# Patient Record
Sex: FEMALE | Race: WHITE | NOT HISPANIC OR LATINO
[De-identification: names, ages, dates, MRNs, and addresses within clinical notes are randomized per-mention and may not be internally consistent; named-entity substitution may affect disease eponyms.]

---

## 2019-06-12 ENCOUNTER — FORM ENCOUNTER (OUTPATIENT)
Age: 74
End: 2019-06-12

## 2020-06-04 ENCOUNTER — FORM ENCOUNTER (OUTPATIENT)
Age: 75
End: 2020-06-04

## 2020-10-18 ENCOUNTER — FORM ENCOUNTER (OUTPATIENT)
Age: 75
End: 2020-10-18

## 2020-12-30 ENCOUNTER — TRANSCRIPTION ENCOUNTER (OUTPATIENT)
Age: 75
End: 2020-12-30

## 2020-12-30 ENCOUNTER — OUTPATIENT (OUTPATIENT)
Dept: OUTPATIENT SERVICES | Facility: HOSPITAL | Age: 75
LOS: 1 days | End: 2020-12-30
Payer: MEDICARE

## 2020-12-30 DIAGNOSIS — Z20.828 CONTACT WITH AND (SUSPECTED) EXPOSURE TO OTHER VIRAL COMMUNICABLE DISEASES: ICD-10-CM

## 2020-12-30 LAB — SARS-COV-2 RNA SPEC QL NAA+PROBE: SIGNIFICANT CHANGE UP

## 2020-12-30 PROCEDURE — C9803: CPT

## 2020-12-30 PROCEDURE — U0003: CPT

## 2020-12-31 DIAGNOSIS — Z20.828 CONTACT WITH AND (SUSPECTED) EXPOSURE TO OTHER VIRAL COMMUNICABLE DISEASES: ICD-10-CM

## 2021-04-13 PROBLEM — Z00.00 ENCOUNTER FOR PREVENTIVE HEALTH EXAMINATION: Status: ACTIVE | Noted: 2021-04-13

## 2021-06-01 DIAGNOSIS — Z87.01 PERSONAL HISTORY OF PNEUMONIA (RECURRENT): ICD-10-CM

## 2021-06-01 DIAGNOSIS — Z87.891 PERSONAL HISTORY OF NICOTINE DEPENDENCE: ICD-10-CM

## 2021-06-01 DIAGNOSIS — Z86.39 PERSONAL HISTORY OF OTHER ENDOCRINE, NUTRITIONAL AND METABOLIC DISEASE: ICD-10-CM

## 2021-06-04 ENCOUNTER — APPOINTMENT (OUTPATIENT)
Dept: BREAST CENTER | Facility: CLINIC | Age: 76
End: 2021-06-04
Payer: MEDICARE

## 2021-06-04 VITALS — DIASTOLIC BLOOD PRESSURE: 81 MMHG | HEART RATE: 60 BPM | HEIGHT: 62.5 IN | SYSTOLIC BLOOD PRESSURE: 146 MMHG

## 2021-06-04 DIAGNOSIS — Z80.3 FAMILY HISTORY OF MALIGNANT NEOPLASM OF BREAST: ICD-10-CM

## 2021-06-04 DIAGNOSIS — N60.12 DIFFUSE CYSTIC MASTOPATHY OF LEFT BREAST: ICD-10-CM

## 2021-06-04 DIAGNOSIS — Z87.19 PERSONAL HISTORY OF OTHER DISEASES OF THE DIGESTIVE SYSTEM: ICD-10-CM

## 2021-06-04 DIAGNOSIS — N60.11 DIFFUSE CYSTIC MASTOPATHY OF LEFT BREAST: ICD-10-CM

## 2021-06-04 PROCEDURE — 99214 OFFICE O/P EST MOD 30 MIN: CPT

## 2021-06-04 RX ORDER — LEVOTHYROXINE SODIUM 75 UG/1
75 TABLET ORAL
Refills: 0 | Status: ACTIVE | COMMUNITY

## 2021-06-04 RX ORDER — ASPIRIN 81 MG/1
81 TABLET, CHEWABLE ORAL
Refills: 0 | Status: ACTIVE | COMMUNITY

## 2021-06-04 RX ORDER — METOPROLOL TARTRATE 50 MG/1
50 TABLET, FILM COATED ORAL
Refills: 0 | Status: ACTIVE | COMMUNITY

## 2021-06-04 RX ORDER — TELMISARTAN 40 MG/1
40 TABLET ORAL
Refills: 0 | Status: ACTIVE | COMMUNITY

## 2021-06-04 RX ORDER — OMEPRAZOLE 40 MG/1
40 CAPSULE, DELAYED RELEASE ORAL
Refills: 0 | Status: ACTIVE | COMMUNITY

## 2021-06-04 NOTE — REASON FOR VISIT
[FreeTextEntry1] : Routine follow-up is a high risk patient with a personal history of ADH and a family history of breast cancer

## 2021-06-04 NOTE — PHYSICAL EXAM
[No Supraclavicular Adenopathy] : no supraclavicular adenopathy [Examined in the supine and seated position] : examined in the supine and seated position [No Cervical Adenopathy] : no cervical adenopathy [Symmetrical] : symmetrical [No dominant masses] : no dominant masses in right breast  [No dominant masses] : no dominant masses left breast [No Nipple Retraction] : no left nipple retraction [No Nipple Discharge] : no left nipple discharge [No Rashes] : no rashes [No Axillary Lymphadenopathy] : no left axillary lymphadenopathy

## 2021-06-04 NOTE — HISTORY OF PRESENT ILLNESS
[FreeTextEntry1] : 2/13 right partial mastectomy for ADH\par [facility] x5 years\par Mother and sister with premenopausal breast cancer\par Lauren model shows a 23.5% lifetime risk of breast cancer\par S DHB gene tested variant of unknown significance\par \par Patient denies any breast masses or nipple discharge.  Recent mammogram and ultrasound was unremarkable.  Patient is status post bilateral wide excisions in 2013 and 2004 1 of which showed atypical ductal hyperplasia.  Patient's mother breast cancer at 52 as well as 2 sisters a cousin and a niece and an aunt who all had breast cancer.  Patient has never taken hormone replacement therapy.

## 2021-06-30 ENCOUNTER — HOSPITAL ENCOUNTER (INPATIENT)
Dept: HOSPITAL 74 - FASUSAT | Age: 76
LOS: 1 days | Discharge: HOME | DRG: 470 | End: 2021-07-01
Attending: ORTHOPAEDIC SURGERY | Admitting: ORTHOPAEDIC SURGERY
Payer: COMMERCIAL

## 2021-06-30 VITALS — BODY MASS INDEX: 31.5 KG/M2

## 2021-06-30 DIAGNOSIS — K22.70: ICD-10-CM

## 2021-06-30 DIAGNOSIS — E03.9: ICD-10-CM

## 2021-06-30 DIAGNOSIS — M17.12: Primary | ICD-10-CM

## 2021-06-30 DIAGNOSIS — I10: ICD-10-CM

## 2021-06-30 PROCEDURE — U0003 INFECTIOUS AGENT DETECTION BY NUCLEIC ACID (DNA OR RNA); SEVERE ACUTE RESPIRATORY SYNDROME CORONAVIRUS 2 (SARS-COV-2) (CORONAVIRUS DISEASE [COVID-19]), AMPLIFIED PROBE TECHNIQUE, MAKING USE OF HIGH THROUGHPUT TECHNOLOGIES AS DESCRIBED BY CMS-2020-01-R: HCPCS

## 2021-06-30 PROCEDURE — 0SRD0J9 REPLACEMENT OF LEFT KNEE JOINT WITH SYNTHETIC SUBSTITUTE, CEMENTED, OPEN APPROACH: ICD-10-PCS | Performed by: ORTHOPAEDIC SURGERY

## 2021-06-30 PROCEDURE — U0005 INFEC AGEN DETEC AMPLI PROBE: HCPCS

## 2021-06-30 PROCEDURE — C9803 HOPD COVID-19 SPEC COLLECT: HCPCS

## 2021-06-30 RX ADMIN — ASPIRIN SCH MG: 81 TABLET, COATED ORAL at 21:17

## 2021-06-30 RX ADMIN — CEFAZOLIN SODIUM SCH MLS/HR: 2 SOLUTION INTRAVENOUS at 20:18

## 2021-06-30 RX ADMIN — ACETAMINOPHEN ONE MG: 10 INJECTION, SOLUTION INTRAVENOUS at 15:38

## 2021-06-30 RX ADMIN — ACETAMINOPHEN ONE: 10 INJECTION, SOLUTION INTRAVENOUS at 16:35

## 2021-06-30 RX ADMIN — DOCUSATE SODIUM,SENNOSIDES SCH TABLET: 50; 8.6 TABLET, FILM COATED ORAL at 21:17

## 2021-06-30 RX ADMIN — ACETAMINOPHEN SCH MG: 10 INJECTION, SOLUTION INTRAVENOUS at 22:57

## 2021-06-30 RX ADMIN — FAMOTIDINE SCH MG: 10 TABLET, FILM COATED ORAL at 21:17

## 2021-07-01 VITALS — HEART RATE: 50 BPM | DIASTOLIC BLOOD PRESSURE: 83 MMHG | SYSTOLIC BLOOD PRESSURE: 149 MMHG | TEMPERATURE: 97.7 F

## 2021-07-01 LAB
ANION GAP SERPL CALC-SCNC: 9 MMOL/L (ref 8–16)
BUN SERPL-MCNC: 13 MG/DL (ref 7–18)
CALCIUM SERPL-MCNC: 9 MG/DL (ref 8.5–10)
CHLORIDE SERPL-SCNC: 102 MMOL/L (ref 98–107)
CO2 SERPL-SCNC: 27 MMOL/L (ref 21–32)
CREAT SERPL-MCNC: 0.5 MG/DL (ref 0.55–1.3)
DEPRECATED RDW RBC AUTO: 13.7 % (ref 11.6–15.6)
GLUCOSE SERPL-MCNC: 116 MG/DL (ref 74–106)
HCT VFR BLD CALC: 41.6 % (ref 32.4–45.2)
HGB BLD-MCNC: 14.2 GM/DL (ref 10.7–15.3)
MCH RBC QN AUTO: 29.3 PG (ref 25.7–33.7)
MCHC RBC AUTO-ENTMCNC: 34.2 G/DL (ref 32–36)
MCV RBC: 85.6 FL (ref 80–96)
PLATELET # BLD AUTO: 379 10^3/UL (ref 134–434)
PMV BLD: 9 FL (ref 7.5–11.1)
RBC # BLD AUTO: 4.86 M/MM3 (ref 3.6–5.2)
SODIUM SERPL-SCNC: 138 MMOL/L (ref 136–145)
WBC # BLD AUTO: 12.5 K/MM3 (ref 4–10.8)

## 2021-07-01 RX ADMIN — CEFAZOLIN SODIUM SCH MLS/HR: 2 SOLUTION INTRAVENOUS at 04:29

## 2021-07-01 RX ADMIN — ACETAMINOPHEN SCH MG: 10 INJECTION, SOLUTION INTRAVENOUS at 08:26

## 2021-07-01 RX ADMIN — FAMOTIDINE SCH MG: 10 TABLET, FILM COATED ORAL at 09:42

## 2021-07-01 RX ADMIN — DOCUSATE SODIUM,SENNOSIDES SCH TABLET: 50; 8.6 TABLET, FILM COATED ORAL at 09:43

## 2021-07-01 RX ADMIN — ASPIRIN SCH MG: 81 TABLET, COATED ORAL at 09:43

## 2021-12-13 ENCOUNTER — NON-APPOINTMENT (OUTPATIENT)
Age: 76
End: 2021-12-13

## 2022-07-12 ENCOUNTER — APPOINTMENT (OUTPATIENT)
Dept: BREAST CENTER | Facility: CLINIC | Age: 77
End: 2022-07-12

## 2022-07-12 VITALS
WEIGHT: 169 LBS | OXYGEN SATURATION: 98 % | SYSTOLIC BLOOD PRESSURE: 144 MMHG | HEIGHT: 62.5 IN | HEART RATE: 56 BPM | DIASTOLIC BLOOD PRESSURE: 73 MMHG | BODY MASS INDEX: 30.32 KG/M2

## 2022-07-12 PROCEDURE — 99213 OFFICE O/P EST LOW 20 MIN: CPT

## 2022-07-12 NOTE — REVIEW OF SYSTEMS
[Negative] : Heme/Lymph [As Noted in HPI] : as noted in HPI [Joint Swelling] : joint swelling [Joint Stiffness] : joint stiffness

## 2022-07-12 NOTE — PAST MEDICAL HISTORY
[Postmenopausal] : The patient is postmenopausal [Menarche Age ____] : age at menarche was [unfilled] [History of Hormone Replacement Treatment] : has no history of hormone replacement treatment [Total Preg ___] : G[unfilled] [Live Births ___] : P[unfilled]  [Age At Live Birth ___] : Age at live birth: [unfilled]

## 2022-07-12 NOTE — HISTORY OF PRESENT ILLNESS
[FreeTextEntry1] : 2/13 right partial mastectomy for ADH\par [facility] x5 years\par Mother and sister with premenopausal breast cancer\par Lauren model shows a 23.5% lifetime risk of breast cancer\par SDHB gene tested variant of unknown significance\par \par Patient denies any breast masses or nipple discharge.  Recent mammogram and ultrasound were unremarkable.  Patient is status post bilateral wide excisions in 2013 and 2004 1 of which showed atypical ductal hyperplasia.  Patient's mother breast cancer at 52 as well as 2 sisters a cousin and a niece and an aunt who all had breast cancer.  Patient has never taken hormone replacement therapy.

## 2022-11-15 DIAGNOSIS — I10 ESSENTIAL (PRIMARY) HYPERTENSION: ICD-10-CM

## 2022-12-09 ENCOUNTER — NON-APPOINTMENT (OUTPATIENT)
Age: 77
End: 2022-12-09

## 2023-07-05 DIAGNOSIS — R92.2 INCONCLUSIVE MAMMOGRAM: ICD-10-CM

## 2023-07-10 ENCOUNTER — RESULT REVIEW (OUTPATIENT)
Age: 78
End: 2023-07-10

## 2023-07-10 ENCOUNTER — NON-APPOINTMENT (OUTPATIENT)
Age: 78
End: 2023-07-10

## 2023-07-10 ENCOUNTER — APPOINTMENT (OUTPATIENT)
Dept: BREAST CENTER | Facility: CLINIC | Age: 78
End: 2023-07-10
Payer: MEDICARE

## 2023-07-10 VITALS — BODY MASS INDEX: 30.32 KG/M2 | HEIGHT: 62.5 IN | WEIGHT: 169 LBS | OXYGEN SATURATION: 99 %

## 2023-07-10 DIAGNOSIS — N60.91 UNSPECIFIED BENIGN MAMMARY DYSPLASIA OF RIGHT BREAST: ICD-10-CM

## 2023-07-10 DIAGNOSIS — Z80.3 FAMILY HISTORY OF MALIGNANT NEOPLASM OF BREAST: ICD-10-CM

## 2023-07-10 DIAGNOSIS — Z91.89 OTHER SPECIFIED PERSONAL RISK FACTORS, NOT ELSEWHERE CLASSIFIED: ICD-10-CM

## 2023-07-10 PROCEDURE — 99213 OFFICE O/P EST LOW 20 MIN: CPT

## 2023-07-12 PROBLEM — Z91.89 AT HIGH RISK FOR BREAST CANCER: Status: ACTIVE | Noted: 2021-06-04

## 2023-07-12 PROBLEM — N60.91 ATYPICAL DUCTAL HYPERPLASIA OF RIGHT BREAST: Status: ACTIVE | Noted: 2021-04-13

## 2023-07-12 PROBLEM — Z80.3 FAMILY HISTORY OF BREAST CANCER: Status: ACTIVE | Noted: 2021-04-13

## 2023-07-12 NOTE — REASON FOR VISIT
[Consult Closing:] : Thank you for allowing me to participate in the care of this patient.  If you have any questions, please do not hesitate to contact me. [Sincerely yours,] : Sincerely yours, [FreeTextEntry3] : Katelynn Salazar M.D. \par \par Electronically proofread and signed by:  Katelynn Salazar MD\par Attending, Department of Radiation Medicine\par \par \par CC: Dr. Brambila\par Dr. Martinez [FreeTextEntry1] : Routine follow-up is a high risk patient with a personal history of ADH and a family history of breast cancer

## 2023-07-12 NOTE — HISTORY OF PRESENT ILLNESS
[FreeTextEntry1] : 2/13 right partial mastectomy for ADH\par [facility] x5 years\par Mother and sister with premenopausal breast cancer\par Lauren model shows a 15.5% 5-year and 27.2% lifetime risk of breast cancer\par SDHB gene tested variant of unknown significance\par \par Patient denies any breast masses or nipple discharge.  Recent mammogram and ultrasound were unremarkable.\par Status post GRANT/BSO with bladder suspension.\par \par Patient is status post bilateral wide excisions in 2013 and 2004 1 of which showed atypical ductal hyperplasia.  Patient's mother breast cancer at 52 as well as 2 sisters a cousin and a niece and an aunt who all had breast cancer.  Patient has never taken hormone replacement therapy.

## 2023-12-07 ENCOUNTER — NON-APPOINTMENT (OUTPATIENT)
Age: 78
End: 2023-12-07

## 2024-07-19 ENCOUNTER — RESULT REVIEW (OUTPATIENT)
Age: 79
End: 2024-07-19

## 2024-07-19 ENCOUNTER — APPOINTMENT (OUTPATIENT)
Dept: BREAST CENTER | Facility: CLINIC | Age: 79
End: 2024-07-19
Payer: MEDICARE

## 2024-07-19 VITALS
HEART RATE: 76 BPM | HEIGHT: 62.5 IN | SYSTOLIC BLOOD PRESSURE: 131 MMHG | BODY MASS INDEX: 31.58 KG/M2 | DIASTOLIC BLOOD PRESSURE: 78 MMHG | OXYGEN SATURATION: 95 % | WEIGHT: 176 LBS

## 2024-07-19 DIAGNOSIS — R92.1 MAMMOGRAPHIC CALCIFICATION FOUND ON DIAGNOSTIC IMAGING OF BREAST: ICD-10-CM

## 2024-07-19 DIAGNOSIS — Z91.89 OTHER SPECIFIED PERSONAL RISK FACTORS, NOT ELSEWHERE CLASSIFIED: ICD-10-CM

## 2024-07-19 DIAGNOSIS — Z80.3 FAMILY HISTORY OF MALIGNANT NEOPLASM OF BREAST: ICD-10-CM

## 2024-07-19 DIAGNOSIS — R92.8 OTHER ABNORMAL AND INCONCLUSIVE FINDINGS ON DIAGNOSTIC IMAGING OF BREAST: ICD-10-CM

## 2024-07-19 PROCEDURE — 99213 OFFICE O/P EST LOW 20 MIN: CPT

## 2024-07-22 PROBLEM — R92.8 ABNORMAL FINDING ON BREAST IMAGING: Status: ACTIVE | Noted: 2024-07-19

## 2024-07-31 ENCOUNTER — RESULT REVIEW (OUTPATIENT)
Age: 79
End: 2024-07-31

## 2024-08-07 ENCOUNTER — NON-APPOINTMENT (OUTPATIENT)
Age: 79
End: 2024-08-07

## 2024-08-07 PROBLEM — C50.412 MALIGNANT NEOPLASM OF UPPER-OUTER QUADRANT OF LEFT BREAST IN FEMALE, ESTROGEN RECEPTOR POSITIVE: Status: ACTIVE | Noted: 2024-08-07

## 2024-08-09 ENCOUNTER — RESULT REVIEW (OUTPATIENT)
Age: 79
End: 2024-08-09

## 2024-08-20 ENCOUNTER — APPOINTMENT (OUTPATIENT)
Dept: BREAST CENTER | Facility: CLINIC | Age: 79
End: 2024-08-20
Payer: MEDICARE

## 2024-08-20 DIAGNOSIS — Z80.3 FAMILY HISTORY OF MALIGNANT NEOPLASM OF BREAST: ICD-10-CM

## 2024-08-20 DIAGNOSIS — C50.412 MALIGNANT NEOPLASM OF UPPER-OUTER QUADRANT OF LEFT FEMALE BREAST: ICD-10-CM

## 2024-08-20 DIAGNOSIS — N60.91 UNSPECIFIED BENIGN MAMMARY DYSPLASIA OF RIGHT BREAST: ICD-10-CM

## 2024-08-20 DIAGNOSIS — Z17.0 MALIGNANT NEOPLASM OF UPPER-OUTER QUADRANT OF LEFT FEMALE BREAST: ICD-10-CM

## 2024-08-20 PROCEDURE — 99213 OFFICE O/P EST LOW 20 MIN: CPT

## 2024-08-26 ENCOUNTER — RESULT REVIEW (OUTPATIENT)
Age: 79
End: 2024-08-26

## 2024-08-28 ENCOUNTER — RESULT REVIEW (OUTPATIENT)
Age: 79
End: 2024-08-28

## 2024-08-29 ENCOUNTER — RESULT REVIEW (OUTPATIENT)
Age: 79
End: 2024-08-29

## 2024-08-29 ENCOUNTER — APPOINTMENT (OUTPATIENT)
Dept: BREAST CENTER | Facility: HOSPITAL | Age: 79
End: 2024-08-29

## 2024-08-29 ENCOUNTER — NON-APPOINTMENT (OUTPATIENT)
Age: 79
End: 2024-08-29

## 2024-09-06 ENCOUNTER — APPOINTMENT (OUTPATIENT)
Dept: BREAST CENTER | Facility: CLINIC | Age: 79
End: 2024-09-06
Payer: MEDICARE

## 2024-09-06 VITALS
OXYGEN SATURATION: 96 % | HEART RATE: 59 BPM | HEIGHT: 62.5 IN | BODY MASS INDEX: 31.76 KG/M2 | DIASTOLIC BLOOD PRESSURE: 77 MMHG | WEIGHT: 177 LBS | SYSTOLIC BLOOD PRESSURE: 144 MMHG

## 2024-09-06 DIAGNOSIS — Z85.3 PERSONAL HISTORY OF MALIGNANT NEOPLASM OF BREAST: ICD-10-CM

## 2024-09-06 PROBLEM — Z90.12 STATUS POST PARTIAL MASTECTOMY OF LEFT BREAST: Status: ACTIVE | Noted: 2024-09-06

## 2024-09-06 PROCEDURE — 99024 POSTOP FOLLOW-UP VISIT: CPT

## 2024-09-06 NOTE — HISTORY OF PRESENT ILLNESS
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  Mammogram and ultrasound showed left breast calcifications, BI-RADS 4.  Stereotactic core biopsy revealed moderately differentiated invasive ductal carcinoma, 3 mm, ER/KY positive, HER2 negative with a Ki-67 of 15%.  Patient had a breast MRI that was focal.   Status post GRANT/BSO with bladder suspension.  Patient is status post bilateral wide excisions in 2013 and 2004 1 of which showed atypical ductal hyperplasia.  Patient's mother breast cancer at 52 as well as 2 sisters a cousin and a niece and an aunt who all had breast cancer.  Patient has never taken hormone replacement therapy.

## 2024-09-06 NOTE — PHYSICAL EXAM
Nursing Note:  Caitlyn Cardenas presents today for possible transfusion-did not qualify.    Patient seen by provider today: No   present during visit today: Not Applicable.    Note: Hgb 9.4, platelets 61    Intravenous Access:  Implanted Port.    Discharge Plan:   Patient was sent to home for saturday's appointment.    Shayy Jose RN   [de-identified] : Incision is healing well without signs of infection.  She has some swelling in the area but no seroma.  There is some nipple retraction from the scar tissue.

## 2024-09-09 ENCOUNTER — APPOINTMENT (OUTPATIENT)
Dept: RADIATION ONCOLOGY | Facility: CLINIC | Age: 79
End: 2024-09-09
Payer: MEDICARE

## 2024-09-09 VITALS
BODY MASS INDEX: 32.94 KG/M2 | DIASTOLIC BLOOD PRESSURE: 66 MMHG | RESPIRATION RATE: 18 BRPM | HEIGHT: 62 IN | WEIGHT: 179 LBS | HEART RATE: 57 BPM | SYSTOLIC BLOOD PRESSURE: 142 MMHG | OXYGEN SATURATION: 95 %

## 2024-09-09 PROCEDURE — G2211 COMPLEX E/M VISIT ADD ON: CPT

## 2024-09-09 PROCEDURE — 99205 OFFICE O/P NEW HI 60 MIN: CPT

## 2024-09-09 RX ORDER — ESZOPICLONE 3 MG/1
3 TABLET, FILM COATED ORAL
Refills: 0 | Status: ACTIVE | COMMUNITY

## 2024-09-09 NOTE — VITALS
[Maximal Pain Intensity: 0/10] : 0/10 [Least Pain Intensity: 0/10] : 0/10 [90: Able to carry normal activity; minor signs or symptoms of disease.] : 90: Able to carry normal activity; minor signs or symptoms of disease.  [Date: ____________] : Patient's last distress assessment performed on [unfilled]. [0 - No Distress] : Distress Level: 0 [Patient given social work contact information and resource sheet] : Patient was given social work contact information and resource sheet

## 2024-09-10 NOTE — PHYSICAL EXAM
[Normal] : no joint swelling, no clubbing or cyanosis of the fingernails and muscle strength and tone were normal [de-identified] : s/p left partial mastectomy

## 2024-09-10 NOTE — LETTER CLOSING
[Consult Closing:] : Thank you for allowing me to participate in the care of this patient.  If you have any questions, please do not hesitate to contact me. [Sincerely yours,] : Sincerely yours, [FreeTextEntry3] : Susanne Garcia MD

## 2024-09-10 NOTE — DISEASE MANAGEMENT
[Pathological] : TNM Stage: p [FreeTextEntry4] : Invasive Ductal Carcinoma of the Left Breast, ER/[place]+ [TTNM] : 2 [NTNM] : X [MTNM] : 0 [IB] : IB

## 2024-09-10 NOTE — DISEASE MANAGEMENT
[Pathological] : TNM Stage: p [FreeTextEntry4] : Invasive Ductal Carcinoma of the Left Breast, ER/[place]+ [TTNM] : 2 [NTNM] : X [IB] : IB [MTNM] : 0

## 2024-09-10 NOTE — HISTORY OF PRESENT ILLNESS
[FreeTextEntry1] : Ms. Judd is a 79 year old female, recently diagnosed with T2Nx ER/MT+ invasive ductal carcinoma, status post left partial mastectomy.  She is present for a consultation to discuss adjuvant radiation therapy to the left breast.   Ms. Judd underwent a routine Mammo/US (24) that demonstrated suspicious calcifications in the upper slightly outer left breast, for which stereotactic guided biopsy was recommended.  No mammographic evidence of malignancy in the right breast. No sonographic evidence of malignancy in either breast.  On 24, she underwent biopsy of the left breast and pathology revealed: invasive ductal carcinoma, grade 1, DCIS present, grade 2, ER/MT >95%, HER2 negative and Ki-67 15%.  Staging MRI Breast (24) 1.  A vertically oriented 5 cm tubular structure in the left 12:00 axis represents likely predominantly postprocedural change along the biopsy tract (stereotactic biopsy of calcifications in this location demonstrated invasive ductal carcinoma). There was slight focal mass enhancement along the midportion of the tract.  2.  No suspicious enhancement in the remainder of the left breast or the right breast.  3.  No axillary lymphadenopathy.  On 24, Rajni Judd underwent left partial mastectomy performed by Dr. Leon. Surgical pathology revealed moderately differentiated invasive ductal carcinoma, 25 mm, EIC. Negative margins the closest invasive and in-situ margin was 1mm in distance from the inferior margin and there was a 2mm distance invasive superior margin.  There was an additional focus of DCIS in the medial segment of the specimen measuring up to 3.5mm less than 1mm from the resection margin.  ER/MT positive, HER2 negative, Ki-67 15%, negative margins (closest medial margin). Pathology stage: T2 NX M0, stage Ib. Ms. Judd tolerated the procedure well without significant side effects.   Of note, she is status post GRANT/BSO with bladder suspension in .  Patient is also status post bilateral wide excisions in  and  of which revealed atypical ductal hyperplasia.  Lauren model had demonstrated a 15.5% 5-year and 27.2% lifetime risk of breast cancer  Genetic testing revealed an SDHB gene tested variant of unknown significance in 2018  GYN HX: , menarche 13, menopause 40's, some BC use, no HRT. GRANT/BSO with bladder suspension in .  Social HX; ex-smoker, 1 ppd, quit 35 years, social ETOH  Patient has a significant family history of breast cancer: mother, diagnosed at 52 s/p mastectomy, 2 sisters - 40's and 30's (younger sibling); maternal cousin, breast cancer; niece, diagnosed ~45 years old and a maternal aunt, diagnosed 80's.

## 2024-09-10 NOTE — PHYSICAL EXAM
[Normal] : no joint swelling, no clubbing or cyanosis of the fingernails and muscle strength and tone were normal [de-identified] : s/p left partial mastectomy

## 2024-09-10 NOTE — HISTORY OF PRESENT ILLNESS
[FreeTextEntry1] : Ms. Judd is a 79 year old female, recently diagnosed with T2Nx ER/ID+ invasive ductal carcinoma, status post left partial mastectomy.  She is present for a consultation to discuss adjuvant radiation therapy to the left breast.   Ms. Judd underwent a routine Mammo/US (24) that demonstrated suspicious calcifications in the upper slightly outer left breast, for which stereotactic guided biopsy was recommended.  No mammographic evidence of malignancy in the right breast. No sonographic evidence of malignancy in either breast.  On 24, she underwent biopsy of the left breast and pathology revealed: invasive ductal carcinoma, grade 1, DCIS present, grade 2, ER/ID >95%, HER2 negative and Ki-67 15%.  Staging MRI Breast (24) 1.  A vertically oriented 5 cm tubular structure in the left 12:00 axis represents likely predominantly postprocedural change along the biopsy tract (stereotactic biopsy of calcifications in this location demonstrated invasive ductal carcinoma). There was slight focal mass enhancement along the midportion of the tract.  2.  No suspicious enhancement in the remainder of the left breast or the right breast.  3.  No axillary lymphadenopathy.  On 24, Rajni Judd underwent left partial mastectomy performed by Dr. Leon. Surgical pathology revealed moderately differentiated invasive ductal carcinoma, 25 mm, EIC. Negative margins the closest invasive and in-situ margin was 1mm in distance from the inferior margin and there was a 2mm distance invasive superior margin.  There was an additional focus of DCIS in the medial segment of the specimen measuring up to 3.5mm less than 1mm from the resection margin.  ER/ID positive, HER2 negative, Ki-67 15%, negative margins (closest medial margin). Pathology stage: T2 NX M0, stage Ib. Ms. Judd tolerated the procedure well without significant side effects.   Of note, she is status post GRANT/BSO with bladder suspension in .  Patient is also status post bilateral wide excisions in  and  of which revealed atypical ductal hyperplasia.  Lauren model had demonstrated a 15.5% 5-year and 27.2% lifetime risk of breast cancer  Genetic testing revealed an SDHB gene tested variant of unknown significance in 2018  GYN HX: , menarche 13, menopause 40's, some BC use, no HRT. GRANT/BSO with bladder suspension in .  Social HX; ex-smoker, 1 ppd, quit 35 years, social ETOH  Patient has a significant family history of breast cancer: mother, diagnosed at 52 s/p mastectomy, 2 sisters - 40's and 30's (younger sibling); maternal cousin, breast cancer; niece, diagnosed ~45 years old and a maternal aunt, diagnosed 80's.

## 2024-09-10 NOTE — HISTORY OF PRESENT ILLNESS
[FreeTextEntry1] : Ms. Judd is a 79 year old female, recently diagnosed with T2Nx ER/MN+ invasive ductal carcinoma, status post left partial mastectomy.  She is present for a consultation to discuss adjuvant radiation therapy to the left breast.   Ms. Judd underwent a routine Mammo/US (24) that demonstrated suspicious calcifications in the upper slightly outer left breast, for which stereotactic guided biopsy was recommended.  No mammographic evidence of malignancy in the right breast. No sonographic evidence of malignancy in either breast.  On 24, she underwent biopsy of the left breast and pathology revealed: invasive ductal carcinoma, grade 1, DCIS present, grade 2, ER/MN >95%, HER2 negative and Ki-67 15%.  Staging MRI Breast (24) 1.  A vertically oriented 5 cm tubular structure in the left 12:00 axis represents likely predominantly postprocedural change along the biopsy tract (stereotactic biopsy of calcifications in this location demonstrated invasive ductal carcinoma). There was slight focal mass enhancement along the midportion of the tract.  2.  No suspicious enhancement in the remainder of the left breast or the right breast.  3.  No axillary lymphadenopathy.  On 24, Rajni Judd underwent left partial mastectomy performed by Dr. Leon. Surgical pathology revealed moderately differentiated invasive ductal carcinoma, 25 mm, EIC. Negative margins the closest invasive and in-situ margin was 1mm in distance from the inferior margin and there was a 2mm distance invasive superior margin.  There was an additional focus of DCIS in the medial segment of the specimen measuring up to 3.5mm less than 1mm from the resection margin.  ER/MN positive, HER2 negative, Ki-67 15%, negative margins (closest medial margin). Pathology stage: T2 NX M0, stage Ib. Ms. Judd tolerated the procedure well without significant side effects.   Of note, she is status post GRANT/BSO with bladder suspension in .  Patient is also status post bilateral wide excisions in  and  of which revealed atypical ductal hyperplasia.  Lauren model had demonstrated a 15.5% 5-year and 27.2% lifetime risk of breast cancer  Genetic testing revealed an SDHB gene tested variant of unknown significance in 2018  GYN HX: , menarche 13, menopause 40's, some BC use, no HRT. GRANT/BSO with bladder suspension in .  Social HX; ex-smoker, 1 ppd, quit 35 years, social ETOH  Patient has a significant family history of breast cancer: mother, diagnosed at 52 s/p mastectomy, 2 sisters - 40's and 30's (younger sibling); maternal cousin, breast cancer; niece, diagnosed ~45 years old and a maternal aunt, diagnosed 80's.

## 2024-09-10 NOTE — DISEASE MANAGEMENT
[Pathological] : TNM Stage: p [FreeTextEntry4] : Invasive Ductal Carcinoma of the Left Breast, ER/[place]+ [TTNM] : 2 [NTNM] : X [IB] : IB [MTNM] : 0

## 2024-09-10 NOTE — PHYSICAL EXAM
[Normal] : no joint swelling, no clubbing or cyanosis of the fingernails and muscle strength and tone were normal [de-identified] : s/p left partial mastectomy

## 2024-09-11 ENCOUNTER — RESULT REVIEW (OUTPATIENT)
Age: 79
End: 2024-09-11

## 2024-09-11 ENCOUNTER — APPOINTMENT (OUTPATIENT)
Dept: HEMATOLOGY ONCOLOGY | Facility: CLINIC | Age: 79
End: 2024-09-11
Payer: MEDICARE

## 2024-09-11 VITALS
BODY MASS INDEX: 33.16 KG/M2 | RESPIRATION RATE: 16 BRPM | WEIGHT: 180.19 LBS | DIASTOLIC BLOOD PRESSURE: 73 MMHG | OXYGEN SATURATION: 96 % | SYSTOLIC BLOOD PRESSURE: 146 MMHG | HEART RATE: 67 BPM | TEMPERATURE: 96.7 F | HEIGHT: 62 IN

## 2024-09-11 DIAGNOSIS — M85.80 OTHER SPECIFIED DISORDERS OF BONE DENSITY AND STRUCTURE, UNSPECIFIED SITE: ICD-10-CM

## 2024-09-11 DIAGNOSIS — C50.412 MALIGNANT NEOPLASM OF UPPER-OUTER QUADRANT OF LEFT FEMALE BREAST: ICD-10-CM

## 2024-09-11 DIAGNOSIS — Z17.0 MALIGNANT NEOPLASM OF UPPER-OUTER QUADRANT OF LEFT FEMALE BREAST: ICD-10-CM

## 2024-09-11 DIAGNOSIS — Z90.12 ACQUIRED ABSENCE OF LEFT BREAST AND NIPPLE: ICD-10-CM

## 2024-09-11 PROCEDURE — 99205 OFFICE O/P NEW HI 60 MIN: CPT

## 2024-09-11 PROCEDURE — 36415 COLL VENOUS BLD VENIPUNCTURE: CPT

## 2024-09-11 NOTE — HISTORY OF PRESENT ILLNESS
[de-identified] : Ms. Snyder is a 79 year old female that presents for newly diagnosed with invasive ductal carcinoma.  Oncological History:  s/p routine Mammo/US (24) that demonstrated suspicious calcifications in the upper slightly outer left breast, for which stereotactic guided biopsy was recommended. No mammographic evidence of malignancy in the right breast. No sonographic evidence of malignancy in either breast.  s/p US guided bx 24 pathology revealed: invasive ductal carcinoma, grade 1, DCIS present, grade 2, ER/IN >95%, HER2 negative and Ki-67 15%.  Staging MRI Breast (24) 1. A vertically oriented 5 cm tubular structure in the left 12:00 axis represents likely predominantly postprocedural change along the biopsy tract (stereotactic biopsy of calcifications in this location demonstrated invasive ductal carcinoma). There is slight focal mass enhancement along the midportion of the tract. 2. No suspicious enhancement in the remainder of the left breast or the right breast. 3. No axillary lymphadenopathy.  s/p L partial mastectomy 24 surgical pathology revealed moderately differentiated invasive ductal carcinoma, 25 mm, EIC, negative margins, ER/IN positive, HER2 negative, Ki-67 15%, negative margins (closest medial margin). Pathology stage: T2 NX M0, stage Ib.  Of note, she is status post GRANT/BSO with bladder suspension in .  Patient is status post bilateral wide excisions in  and  of which showed atypical ductal hyperplasia.  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 GRANT/BSO with bladder suspension in . Social HX; ex-smoker, 1 ppd, quit 35 years, social ETOH Patient has a significant family history of breast cancer: mother, diagnosed at 52 s/p mastectomy, 2 sisters - 40's and 30's (younger sibling); maternal cousin, breast cancer; niece, diagnosed ~45 years old and a maternal aunt, diagnosed 80's.

## 2024-09-11 NOTE — ASSESSMENT
[FreeTextEntry1] :  # IDC, 25 mm, EIC, negative margins, ER/IA positive, HER2 negative, Ki-67 15%, negative margins (closest medial margin). Pathology stage: T2 NX M0, stage Ib. Reviewed the diagnosis, prognosis and natural history of ER/IA positive, HER2 negative IDC Reviewed the imaging and path Reviewed the NCCN guidelines and patient expresses understanding We have discussed that the patients tumor is >0.5 cm, node negative and hormone receptor positive thus she qualifies for Oncotype DX, a genomic test that will provide a recurrence score (RS). We discussed the role of oncotype in determining whether there would be a benefit of adding chemotherapy in addition to hormonal therapy based on her risk of recurrence.  Since she is  >49 yo would only recommend the addition of chemotherapy if her recurrence score is > 25. She expressed understanding and she would like to proceed. We did discuss despite her recurrence score I would recommend an aromatase inhibitor, Anastrozole, 1mg PO q day for a minimum of 5 years given that her tumor is ER/IA+ and she is post menopausal. We have reviewed the side effects of Anastrozole to include but are not limited to hot flashes, mood swings, arthralgias, bone pain, thinning of the bones including osteopenia/osteoporosis. She will take this with Ca++ 1200 mg PO q day and Vit D 1000 IU daily to protect her bone health.  DEXA scan 5/24 - osteopenia, L spine T score -1/2, L femoral neck -2.0, L total hip -1.5  #osteopenia DEXA scan 5/24 - osteopenia, L spine T score -1/2, L femoral neck -2.0, L total hip -1.5. recheck 5/2025 continue ca++ and vit D continue weight bearing exercise limit alcohol maintain healthy BMI  RTC in 2 weeks to review oncotype .

## 2024-09-11 NOTE — HISTORY OF PRESENT ILLNESS
[de-identified] : Ms. Snyder is a 79 year old female that presents for newly diagnosed with invasive ductal carcinoma.  Oncological History:  s/p routine Mammo/US (24) that demonstrated suspicious calcifications in the upper slightly outer left breast, for which stereotactic guided biopsy was recommended. No mammographic evidence of malignancy in the right breast. No sonographic evidence of malignancy in either breast.  s/p US guided bx 24 pathology revealed: invasive ductal carcinoma, grade 1, DCIS present, grade 2, ER/KY >95%, HER2 negative and Ki-67 15%.  Staging MRI Breast (24) 1. A vertically oriented 5 cm tubular structure in the left 12:00 axis represents likely predominantly postprocedural change along the biopsy tract (stereotactic biopsy of calcifications in this location demonstrated invasive ductal carcinoma). There is slight focal mass enhancement along the midportion of the tract. 2. No suspicious enhancement in the remainder of the left breast or the right breast. 3. No axillary lymphadenopathy.  s/p L partial mastectomy 24 surgical pathology revealed moderately differentiated invasive ductal carcinoma, 25 mm, EIC, negative margins, ER/KY positive, HER2 negative, Ki-67 15%, negative margins (closest medial margin). Pathology stage: T2 NX M0, stage Ib.  Of note, she is status post GRANT/BSO with bladder suspension in .  Patient is status post bilateral wide excisions in  and  of which showed atypical ductal hyperplasia.  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 GRANT/BSO with bladder suspension in . Social HX; ex-smoker, 1 ppd, quit 35 years, social ETOH Patient has a significant family history of breast cancer: mother, diagnosed at 52 s/p mastectomy, 2 sisters - 40's and 30's (younger sibling); maternal cousin, breast cancer; niece, diagnosed ~45 years old and a maternal aunt, diagnosed 80's.

## 2024-09-11 NOTE — ASSESSMENT
[FreeTextEntry1] :  # IDC, 25 mm, EIC, negative margins, ER/[place] positive, HER2 negative, Ki-67 15%, negative margins (closest medial margin). Pathology stage: T2 NX M0, stage Ib. Reviewed the diagnosis, prognosis and natural history of ER/WI positive, HER2 negative IDC Reviewed the imaging and path Reviewed the NCCN guidelines and patient expresses understanding We have discussed that the patients tumor is >0.5 cm, node negative and hormone receptor positive thus she qualifies for Oncotype DX, a genomic test that will provide a recurrence score (RS). We discussed the role of oncotype in determining whether there would be a benefit of adding chemotherapy in addition to hormonal therapy based on her risk of recurrence.  Since she is  >49 yo would only recommend the addition of chemotherapy if her recurrence score is > 25. She expressed understanding and she would like to proceed. We did discuss despite her recurrence score I would recommend an aromatase inhibitor, Anastrozole, 1mg PO q day for a minimum of 5 years given that her tumor is ER/[place]+ and she is post menopausal. We have reviewed the side effects of Anastrozole to include but are not limited to hot flashes, mood swings, arthralgias, bone pain, thinning of the bones including osteopenia/osteoporosis. She will take this with Ca++ 1200 mg PO q day and Vit D 1000 IU daily to protect her bone health.  DEXA scan 5/24 - osteopenia, L spine T score -1/2, L femoral neck -2.0, L total hip -1.5  #osteopenia DEXA scan 5/24 - osteopenia, L spine T score -1/2, L femoral neck -2.0, L total hip -1.5. recheck 5/2025 continue ca++ and vit D continue weight bearing exercise limit alcohol maintain healthy BMI  RTC in 2 weeks to review oncotype .

## 2024-09-30 ENCOUNTER — NON-APPOINTMENT (OUTPATIENT)
Age: 79
End: 2024-09-30

## 2024-09-30 ENCOUNTER — APPOINTMENT (OUTPATIENT)
Dept: HEMATOLOGY ONCOLOGY | Facility: CLINIC | Age: 79
End: 2024-09-30
Payer: MEDICARE

## 2024-09-30 VITALS
SYSTOLIC BLOOD PRESSURE: 131 MMHG | DIASTOLIC BLOOD PRESSURE: 73 MMHG | BODY MASS INDEX: 33.16 KG/M2 | HEIGHT: 62 IN | WEIGHT: 180.19 LBS | RESPIRATION RATE: 16 BRPM | OXYGEN SATURATION: 96 % | HEART RATE: 64 BPM | TEMPERATURE: 97.4 F

## 2024-09-30 DIAGNOSIS — M85.80 OTHER SPECIFIED DISORDERS OF BONE DENSITY AND STRUCTURE, UNSPECIFIED SITE: ICD-10-CM

## 2024-09-30 DIAGNOSIS — C50.412 MALIGNANT NEOPLASM OF UPPER-OUTER QUADRANT OF LEFT FEMALE BREAST: ICD-10-CM

## 2024-09-30 DIAGNOSIS — Z17.0 MALIGNANT NEOPLASM OF UPPER-OUTER QUADRANT OF LEFT FEMALE BREAST: ICD-10-CM

## 2024-09-30 PROCEDURE — 36415 COLL VENOUS BLD VENIPUNCTURE: CPT

## 2024-09-30 PROCEDURE — 99214 OFFICE O/P EST MOD 30 MIN: CPT

## 2024-09-30 RX ORDER — ANASTROZOLE TABLETS 1 MG/1
1 TABLET ORAL DAILY
Qty: 90 | Refills: 3 | Status: ACTIVE | COMMUNITY
Start: 2024-09-30 | End: 1900-01-01

## 2024-09-30 NOTE — BEGINNING OF VISIT
[0] : 2) Feeling down, depressed, or hopeless: Not at all (0) [[id number]] : 0 [Pain Scale: ___] : On a scale of 1-10, today the patient's pain is a(n) [unfilled]. [Former] : Former [10-14] : 10-14 [> 15 Years] : > 15 Years [Date Discussed (MM/DD/YY): ___] : Discussed: [unfilled] [With Patient/Caregiver] : with Patient/Caregiver

## 2024-09-30 NOTE — ASSESSMENT
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 Pathology stage: T2 NX M0, stage Ib. Reviewed the diagnosis, prognosis and natural history of ER/ND positive, HER2 negative IDC Reviewed the imaging and path Reviewed the NCCN guidelines and patient expresses understanding oncotype 1 - no benefit of chemo recommend RT then anastrozole for a minimum of 5 years with ca++ and vit D DEXA scan 5/23 - osteopenia, L spine T score -1/2, L femoral neck -2.0, L total hip -1.5  #osteopenia DEXA scan 5/23 - osteopenia, L spine T score -1/2, L femoral neck -2.0, L total hip -1.5. recheck 5/2025 continue ca++ and vit D continue weight bearing exercise limit alcohol maintain healthy BMI discussed that we will make a decision about prolia/zometa after her next dexa but if she is seeing dentist - should get dental clearance

## 2024-09-30 NOTE — BEGINNING OF VISIT
[0] : 2) Feeling down, depressed, or hopeless: Not at all (0) [[id number]] : 0 [Pain Scale: ___] : On a scale of 1-10, today the patient's pain is a(n) [unfilled]. [Former] : Former [10-14] : 10-14 [> 15 Years] : > 15 Years [Date Discussed (MM/DD/YY): ___] : Discussed: [unfilled] [With Patient/Caregiver] : with Patient/Caregiver

## 2024-09-30 NOTE — HISTORY OF PRESENT ILLNESS
[de-identified] : Ms. Snyder is a 79 year old female that presents for newly diagnosed with invasive ductal carcinoma.  Oncological History:  s/p routine Mammo/US (24) that demonstrated suspicious calcifications in the upper slightly outer left breast, for which stereotactic guided biopsy was recommended. No mammographic evidence of malignancy in the right breast. No sonographic evidence of malignancy in either breast.  s/p US guided bx 24 pathology revealed: invasive ductal carcinoma, grade 1, DCIS present, grade 2, ER/OH >95%, HER2 negative and Ki-67 15%.  Staging MRI Breast (24) 1. A vertically oriented 5 cm tubular structure in the left 12:00 axis represents likely predominantly postprocedural change along the biopsy tract (stereotactic biopsy of calcifications in this location demonstrated invasive ductal carcinoma). There is slight focal mass enhancement along the midportion of the tract. 2. No suspicious enhancement in the remainder of the left breast or the right breast. 3. No axillary lymphadenopathy.  s/p L partial mastectomy 24 surgical pathology revealed moderately differentiated invasive ductal carcinoma, 25 mm, EIC, negative margins, ER/OH positive, HER2 negative, Ki-67 15%, negative margins (closest medial margin). Pathology stage: T2 NX M0, stage Ib.  Of note, she is status post GRANT/BSO with bladder suspension in .  Patient is status post bilateral wide excisions in  and  of which showed atypical ductal hyperplasia.  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 GRANT/BSO with bladder suspension in . Social HX; ex-smoker, 1 ppd, quit 35 years, social ETOH Patient has a significant family history of breast cancer: mother, diagnosed at 52 s/p mastectomy, 2 sisters - 40's and 30's (younger sibling); maternal cousin, breast cancer; niece, diagnosed ~45 years old and a maternal aunt, diagnosed 80's. [de-identified] : Patient presents today for follow-uo. Here to review Oncotype Reports feeling hot at times and fatigue

## 2024-09-30 NOTE — HISTORY OF PRESENT ILLNESS
[de-identified] : Ms. Snyder is a 79 year old female that presents for newly diagnosed with invasive ductal carcinoma.  Oncological History:  s/p routine Mammo/US (24) that demonstrated suspicious calcifications in the upper slightly outer left breast, for which stereotactic guided biopsy was recommended. No mammographic evidence of malignancy in the right breast. No sonographic evidence of malignancy in either breast.  s/p US guided bx 24 pathology revealed: invasive ductal carcinoma, grade 1, DCIS present, grade 2, ER/MA >95%, HER2 negative and Ki-67 15%.  Staging MRI Breast (24) 1. A vertically oriented 5 cm tubular structure in the left 12:00 axis represents likely predominantly postprocedural change along the biopsy tract (stereotactic biopsy of calcifications in this location demonstrated invasive ductal carcinoma). There is slight focal mass enhancement along the midportion of the tract. 2. No suspicious enhancement in the remainder of the left breast or the right breast. 3. No axillary lymphadenopathy.  s/p L partial mastectomy 24 surgical pathology revealed moderately differentiated invasive ductal carcinoma, 25 mm, EIC, negative margins, ER/MA positive, HER2 negative, Ki-67 15%, negative margins (closest medial margin). Pathology stage: T2 NX M0, stage Ib.  Of note, she is status post GRANT/BSO with bladder suspension in .  Patient is status post bilateral wide excisions in  and  of which showed atypical ductal hyperplasia.  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 GRANT/BSO with bladder suspension in . Social HX; ex-smoker, 1 ppd, quit 35 years, social ETOH Patient has a significant family history of breast cancer: mother, diagnosed at 52 s/p mastectomy, 2 sisters - 40's and 30's (younger sibling); maternal cousin, breast cancer; niece, diagnosed ~45 years old and a maternal aunt, diagnosed 80's. [de-identified] : Patient presents today for follow-uo. Here to review Oncotype Reports feeling hot at times and fatigue

## 2024-10-29 ENCOUNTER — NON-APPOINTMENT (OUTPATIENT)
Age: 79
End: 2024-10-29

## 2024-10-29 VITALS
WEIGHT: 176 LBS | HEART RATE: 69 BPM | SYSTOLIC BLOOD PRESSURE: 121 MMHG | BODY MASS INDEX: 32.39 KG/M2 | DIASTOLIC BLOOD PRESSURE: 56 MMHG | TEMPERATURE: 97.3 F | HEIGHT: 62 IN | OXYGEN SATURATION: 96 % | RESPIRATION RATE: 16 BRPM

## 2024-11-05 ENCOUNTER — NON-APPOINTMENT (OUTPATIENT)
Age: 79
End: 2024-11-05

## 2024-11-05 VITALS
OXYGEN SATURATION: 97 % | BODY MASS INDEX: 32.2 KG/M2 | SYSTOLIC BLOOD PRESSURE: 130 MMHG | HEART RATE: 73 BPM | WEIGHT: 175 LBS | TEMPERATURE: 98.2 F | RESPIRATION RATE: 16 BRPM | DIASTOLIC BLOOD PRESSURE: 64 MMHG | HEIGHT: 62 IN

## 2024-11-12 ENCOUNTER — NON-APPOINTMENT (OUTPATIENT)
Age: 79
End: 2024-11-12

## 2024-11-12 DIAGNOSIS — C50.412 MALIGNANT NEOPLASM OF UPPER-OUTER QUADRANT OF LEFT FEMALE BREAST: ICD-10-CM

## 2024-11-12 DIAGNOSIS — Z17.0 MALIGNANT NEOPLASM OF UPPER-OUTER QUADRANT OF LEFT FEMALE BREAST: ICD-10-CM

## 2024-12-19 ENCOUNTER — APPOINTMENT (OUTPATIENT)
Dept: RADIATION ONCOLOGY | Facility: CLINIC | Age: 79
End: 2024-12-19

## 2024-12-19 DIAGNOSIS — Z17.0 MALIGNANT NEOPLASM OF UPPER-OUTER QUADRANT OF LEFT FEMALE BREAST: ICD-10-CM

## 2024-12-19 DIAGNOSIS — C50.412 MALIGNANT NEOPLASM OF UPPER-OUTER QUADRANT OF LEFT FEMALE BREAST: ICD-10-CM

## 2024-12-19 PROCEDURE — 99212 OFFICE O/P EST SF 10 MIN: CPT

## 2025-04-12 ENCOUNTER — NON-APPOINTMENT (OUTPATIENT)
Age: 80
End: 2025-04-12

## 2025-04-14 ENCOUNTER — RESULT REVIEW (OUTPATIENT)
Age: 80
End: 2025-04-14

## 2025-04-14 ENCOUNTER — APPOINTMENT (OUTPATIENT)
Dept: HEMATOLOGY ONCOLOGY | Facility: CLINIC | Age: 80
End: 2025-04-14
Payer: MEDICARE

## 2025-04-14 VITALS
DIASTOLIC BLOOD PRESSURE: 74 MMHG | BODY MASS INDEX: 31.83 KG/M2 | TEMPERATURE: 96.9 F | RESPIRATION RATE: 16 BRPM | OXYGEN SATURATION: 97 % | SYSTOLIC BLOOD PRESSURE: 138 MMHG | HEIGHT: 62 IN | HEART RATE: 50 BPM | WEIGHT: 173 LBS

## 2025-04-14 DIAGNOSIS — Z17.0 MALIGNANT NEOPLASM OF UPPER-OUTER QUADRANT OF LEFT FEMALE BREAST: ICD-10-CM

## 2025-04-14 DIAGNOSIS — M85.80 OTHER SPECIFIED DISORDERS OF BONE DENSITY AND STRUCTURE, UNSPECIFIED SITE: ICD-10-CM

## 2025-04-14 DIAGNOSIS — C50.412 MALIGNANT NEOPLASM OF UPPER-OUTER QUADRANT OF LEFT FEMALE BREAST: ICD-10-CM

## 2025-04-14 DIAGNOSIS — R25.2 CRAMP AND SPASM: ICD-10-CM

## 2025-04-14 PROCEDURE — 36430 TRANSFUSION BLD/BLD COMPNT: CPT

## 2025-04-14 PROCEDURE — G2211 COMPLEX E/M VISIT ADD ON: CPT

## 2025-04-14 PROCEDURE — 99215 OFFICE O/P EST HI 40 MIN: CPT

## 2025-04-17 ENCOUNTER — APPOINTMENT (OUTPATIENT)
Dept: RADIATION ONCOLOGY | Facility: CLINIC | Age: 80
End: 2025-04-17
Payer: MEDICARE

## 2025-04-17 VITALS
OXYGEN SATURATION: 98 % | RESPIRATION RATE: 16 BRPM | TEMPERATURE: 97.4 F | SYSTOLIC BLOOD PRESSURE: 136 MMHG | HEART RATE: 54 BPM | DIASTOLIC BLOOD PRESSURE: 76 MMHG

## 2025-04-17 PROCEDURE — G2211 COMPLEX E/M VISIT ADD ON: CPT

## 2025-04-17 PROCEDURE — 99214 OFFICE O/P EST MOD 30 MIN: CPT

## 2025-04-17 RX ORDER — AZELASTINE HYDROCHLORIDE 0.5 MG/ML
0.05 SOLUTION/ DROPS OPHTHALMIC
Qty: 24 | Refills: 0 | Status: ACTIVE | COMMUNITY
Start: 2025-04-11

## 2025-04-22 ENCOUNTER — APPOINTMENT (OUTPATIENT)
Dept: BREAST CENTER | Facility: CLINIC | Age: 80
End: 2025-04-22
Payer: MEDICARE

## 2025-04-22 VITALS — HEIGHT: 62.5 IN | BODY MASS INDEX: 30.5 KG/M2 | WEIGHT: 170 LBS

## 2025-04-22 DIAGNOSIS — Z80.3 FAMILY HISTORY OF MALIGNANT NEOPLASM OF BREAST: ICD-10-CM

## 2025-04-22 DIAGNOSIS — Z85.3 PERSONAL HISTORY OF MALIGNANT NEOPLASM OF BREAST: ICD-10-CM

## 2025-04-22 DIAGNOSIS — Z90.12 ACQUIRED ABSENCE OF LEFT BREAST AND NIPPLE: ICD-10-CM

## 2025-04-22 PROCEDURE — 99213 OFFICE O/P EST LOW 20 MIN: CPT

## 2025-05-05 ENCOUNTER — NON-APPOINTMENT (OUTPATIENT)
Age: 80
End: 2025-05-05

## 2025-07-21 ENCOUNTER — RESULT REVIEW (OUTPATIENT)
Age: 80
End: 2025-07-21

## 2025-07-22 ENCOUNTER — APPOINTMENT (OUTPATIENT)
Dept: BREAST CENTER | Facility: CLINIC | Age: 80
End: 2025-07-22
Payer: MEDICARE

## 2025-07-22 VITALS
BODY MASS INDEX: 30.86 KG/M2 | DIASTOLIC BLOOD PRESSURE: 75 MMHG | HEART RATE: 64 BPM | OXYGEN SATURATION: 95 % | WEIGHT: 172 LBS | HEIGHT: 62.5 IN | SYSTOLIC BLOOD PRESSURE: 111 MMHG

## 2025-07-22 DIAGNOSIS — Z90.12 ACQUIRED ABSENCE OF LEFT BREAST AND NIPPLE: ICD-10-CM

## 2025-07-22 DIAGNOSIS — Z85.3 PERSONAL HISTORY OF MALIGNANT NEOPLASM OF BREAST: ICD-10-CM

## 2025-07-22 PROCEDURE — 99213 OFFICE O/P EST LOW 20 MIN: CPT

## 2025-08-21 ENCOUNTER — APPOINTMENT (OUTPATIENT)
Dept: RADIATION ONCOLOGY | Facility: CLINIC | Age: 80
End: 2025-08-21
Payer: MEDICARE

## 2025-08-21 VITALS
DIASTOLIC BLOOD PRESSURE: 57 MMHG | TEMPERATURE: 98 F | RESPIRATION RATE: 18 BRPM | OXYGEN SATURATION: 96 % | HEART RATE: 61 BPM | SYSTOLIC BLOOD PRESSURE: 124 MMHG

## 2025-08-21 DIAGNOSIS — C50.412 MALIGNANT NEOPLASM OF UPPER-OUTER QUADRANT OF LEFT FEMALE BREAST: ICD-10-CM

## 2025-08-21 DIAGNOSIS — Z17.0 MALIGNANT NEOPLASM OF UPPER-OUTER QUADRANT OF LEFT FEMALE BREAST: ICD-10-CM

## 2025-08-21 PROCEDURE — G2211 COMPLEX E/M VISIT ADD ON: CPT

## 2025-08-21 PROCEDURE — 99214 OFFICE O/P EST MOD 30 MIN: CPT

## 2025-09-04 ENCOUNTER — NON-APPOINTMENT (OUTPATIENT)
Age: 80
End: 2025-09-04

## 2025-09-11 ENCOUNTER — RESULT REVIEW (OUTPATIENT)
Age: 80
End: 2025-09-11

## 2025-09-11 ENCOUNTER — APPOINTMENT (OUTPATIENT)
Dept: HEMATOLOGY ONCOLOGY | Facility: CLINIC | Age: 80
End: 2025-09-11
Payer: MEDICARE

## 2025-09-11 VITALS
DIASTOLIC BLOOD PRESSURE: 65 MMHG | WEIGHT: 175.31 LBS | BODY MASS INDEX: 32.26 KG/M2 | TEMPERATURE: 97.1 F | HEART RATE: 54 BPM | RESPIRATION RATE: 16 BRPM | HEIGHT: 62 IN | OXYGEN SATURATION: 96 % | SYSTOLIC BLOOD PRESSURE: 136 MMHG

## 2025-09-11 DIAGNOSIS — C50.412 MALIGNANT NEOPLASM OF UPPER-OUTER QUADRANT OF LEFT FEMALE BREAST: ICD-10-CM

## 2025-09-11 DIAGNOSIS — Z85.3 PERSONAL HISTORY OF MALIGNANT NEOPLASM OF BREAST: ICD-10-CM

## 2025-09-11 DIAGNOSIS — Z90.12 ACQUIRED ABSENCE OF LEFT BREAST AND NIPPLE: ICD-10-CM

## 2025-09-11 DIAGNOSIS — Z17.0 MALIGNANT NEOPLASM OF UPPER-OUTER QUADRANT OF LEFT FEMALE BREAST: ICD-10-CM

## 2025-09-11 DIAGNOSIS — M85.80 OTHER SPECIFIED DISORDERS OF BONE DENSITY AND STRUCTURE, UNSPECIFIED SITE: ICD-10-CM

## 2025-09-11 PROCEDURE — 99214 OFFICE O/P EST MOD 30 MIN: CPT

## 2025-09-11 PROCEDURE — 36415 COLL VENOUS BLD VENIPUNCTURE: CPT

## 2025-09-11 PROCEDURE — G2211 COMPLEX E/M VISIT ADD ON: CPT
